# Patient Record
Sex: MALE | Race: WHITE | Employment: OTHER | URBAN - METROPOLITAN AREA
[De-identification: names, ages, dates, MRNs, and addresses within clinical notes are randomized per-mention and may not be internally consistent; named-entity substitution may affect disease eponyms.]

---

## 2017-02-27 ENCOUNTER — TELEPHONE (OUTPATIENT)
Dept: NEUROLOGY | Facility: CLINIC | Age: 62
End: 2017-02-27

## 2017-02-27 NOTE — TELEPHONE ENCOUNTER
Rec'd fax refill request for Memantine 5mg from [facility]. Per [facility] records, this patient was seen by Dr. Claudia Valdovinos in ED, but never in clinic. One fill of this medication was given with the note that further refills must come from PCP.   Per TE from 10/11

## 2017-05-02 ENCOUNTER — APPOINTMENT (OUTPATIENT)
Dept: CT IMAGING | Facility: HOSPITAL | Age: 62
End: 2017-05-02
Attending: EMERGENCY MEDICINE
Payer: MEDICARE

## 2017-05-02 ENCOUNTER — APPOINTMENT (OUTPATIENT)
Dept: GENERAL RADIOLOGY | Facility: HOSPITAL | Age: 62
End: 2017-05-02
Attending: EMERGENCY MEDICINE
Payer: MEDICARE

## 2017-05-02 PROCEDURE — 71010 XR CHEST AP PORTABLE  (CPT=71010): CPT

## 2017-05-02 PROCEDURE — 70450 CT HEAD/BRAIN W/O DYE: CPT

## 2017-05-02 NOTE — ED NOTES
BEDSIDE REPORT WAS RECEIVED FROM [facility]. PER REPORT, PT IS NOT SUICIDAL AND NO SECLUSION IS NEEDED. PT IS CALM WITH FLAT AFFECT. FAMILY AT BEDSIDE.  PT DECLINES ANY SI.

## 2017-05-02 NOTE — ED PROVIDER NOTES
Patient Seen in: [facility] Emergency Department    History   Patient presents with:  Eval-P (psychiatric)  Altered Mental Status (neurologic)    Stated Complaint: confusion for two weeks     HPI    Is a 78-year-old male presenting with evaluation. times daily. Sertraline HCl 100 MG Oral Tab,  Take 1.5 tablets (150 mg total) by mouth daily. This is his usual dose  Patient taking differently: Take 100 mg by mouth daily.  This is his usual dose    Benztropine Mesylate 0.5 MG Oral Tab,  Take 1 tablet mmHg  Pulse 60  Temp(Src) 98.1 °F (36.7 °C) (Temporal)  Resp 16  Ht 172.7 cm (5' 8\")  Wt 59.8 kg  BMI 20.05 kg/m2  SpO2 96%        Physical Exam  Generally the patient is alert and oriented ×3 and appears in no distress patient appears unkempt and very we RBC 3.88 (*)     HGB 12.5 (*)     HCT 36.0 (*)     RDW-SD 47.6 (*)     Neutrophil Absolute Prelim 6.84 (*)     Neutrophil Absolute 6.84 (*)     Monocyte Absolute 1.23 (*)     All other components within normal limits   ETHYL ALCOHOL - Normal   PROTHROMBIN

## 2017-05-02 NOTE — ED NOTES
Pt states not eating or drinking much fluid,  Not taking meds,  Thought it would be all right without them. Pt and family deny suicidal thoughts,   Pt states he has no physical complaints,  Appears weak and unsteady while getting undressed wit assistance.

## 2017-05-02 NOTE — ED INITIAL ASSESSMENT (HPI)
Pt to ED from home with family. Family states has noted pt to be more confused, with difficulty completing sentences, decreased po intake, not taking medications and visual hallucinations. Pt reports SI, denies plan.

## 2017-05-03 PROBLEM — F03.90 MAJOR NEUROCOGNITIVE DISORDER (HCC): Status: ACTIVE | Noted: 2017-05-03

## 2017-05-03 PROBLEM — F31.5 BIPOLAR I DISORDER, MOST RECENT EPISODE (OR CURRENT) DEPRESSED, SEVERE, SPECIFIED AS WITH PSYCHOTIC BEHAVIOR (HCC): Status: ACTIVE | Noted: 2017-05-03

## 2017-05-03 NOTE — ED NOTES
Sylvia from [facility] at bedside, family also at bedside for psych evaluation. Per nurse to nurse report, pt is not eating or drinking and family believes he is not taking his medications. Pt is confused, diagnoses with early dementia 2016.  Pt does not know th

## 2017-05-03 NOTE — BH LEVEL OF CARE ASSESSMENT
Level of Care Assessment Note  General Questions  Why are you here?: Pt reported \"I am sleepy, I dont know why I am here. I am sick\"' Pt presents confused, disorganized speech and he is oriented x2 (self and place). Pt appears frail/weak and disheveled. Pt has flight of ideas and his thoughts are disorganized. Pt has been pacing in the house and is unable sit or calm himself down. Pt  has not been taking his medications.  Pt has not been eating well for the past 3 weeks.  He goes days without eating anyhtin Attempt, Did You Expect to[de-identified] Unknown  Describe Past Expectation: Pt refused to answert he question and kept his eyes closed    Past Suicide Risk Mitigating Factors: Unknow per pt    Past Suicide Risk Collateral Provided By[de-identified] Family    Describe Past Suicide asleep  Number of Sleep Hours: 2 Hours  Use of Sleep Aids: Per pt he does not sleep all nigth    Appetite Symptoms: No appetite  Unplanned Weight Loss: No  Unplanned Weight Gain: No  History of Eating Disorder: No  Active Eating Disorder: No of Gang Involvement: No  Type of Residence: Private residence    Abuse Assessment  Physical Abuse: Denies  Verbal Abuse: Denies  Sexual Abuse: Denies  Neglect: Denies  Does anyone say or do something to you that makes you feel unsafe?: No  [facility] from 6/6/16- 6/9/16 for Altered Mental Status. Pt was admitted to [facility] 11/18/15-11/23/15 for Bipolar dx.  Pt has a current psychiatrist but no current therapist     Level of Care Recommendations  Consulted with: Dr. Stephany Sandy recommended inpt admission

## 2017-05-03 NOTE — ED NOTES
Pt up to the bathroom with clear speech. Spoke with Jazmine Ennis who is waiting for the psychiatrist to call back for admission.

## 2017-05-03 NOTE — ED NOTES
RN went into room for wound care. Pt's head was covered with blanket, when uncovered, RN found pt had spilled his orange juice all over himself. Pt was muttering to himself and hands were shaking. Pt was regowned, new blankets given.  Accucheck performed:

## 2017-05-05 ENCOUNTER — OFFICE VISIT (OUTPATIENT)
Dept: WOUND CARE | Facility: HOSPITAL | Age: 62
End: 2017-05-05
Attending: INTERNAL MEDICINE
Payer: MEDICARE

## 2017-05-05 DIAGNOSIS — S81.801A LEG WOUND, RIGHT, INITIAL ENCOUNTER: Primary | ICD-10-CM

## 2017-05-05 DIAGNOSIS — S81.802A LEG WOUND, LEFT, INITIAL ENCOUNTER: ICD-10-CM

## 2017-05-05 PROCEDURE — 99214 OFFICE O/P EST MOD 30 MIN: CPT

## 2018-01-08 ENCOUNTER — APPOINTMENT (OUTPATIENT)
Dept: CT IMAGING | Facility: HOSPITAL | Age: 63
DRG: 948 | End: 2018-01-08
Attending: EMERGENCY MEDICINE
Payer: MEDICARE

## 2018-01-08 PROBLEM — G31.09 OTHER FRONTOTEMPORAL DEMENTIA WITH BEHAVIORAL DISTURBANCE (HCC): Status: ACTIVE | Noted: 2018-01-08

## 2018-01-08 PROBLEM — F02.81 OTHER FRONTOTEMPORAL DEMENTIA WITH BEHAVIORAL DISTURBANCE (HCC): Status: ACTIVE | Noted: 2018-01-08

## 2018-01-08 PROCEDURE — 70450 CT HEAD/BRAIN W/O DYE: CPT | Performed by: EMERGENCY MEDICINE

## 2018-01-09 ENCOUNTER — APPOINTMENT (OUTPATIENT)
Dept: MRI IMAGING | Facility: HOSPITAL | Age: 63
DRG: 948 | End: 2018-01-09
Attending: PHYSICIAN ASSISTANT
Payer: MEDICARE

## 2018-01-09 PROCEDURE — 70553 MRI BRAIN STEM W/O & W/DYE: CPT | Performed by: PHYSICIAN ASSISTANT

## 2018-01-09 NOTE — PLAN OF CARE
Pt is A/O x 1. Only knows he is in the hospital and wants to leave. States he cannot afford to be here and he wants to go home. Pt told it is not safe for him to leave at this time and that he needs to be evaluated by a physician.     Pt is in restraints be

## 2018-01-09 NOTE — ED INITIAL ASSESSMENT (HPI)
Sister states went to see the patient today and found him to be confused, states the house was in disarray, states the stove and oven were on. Family thinks the patient may have taken too much of his medications.

## 2018-01-09 NOTE — ED NOTES
Received call from lab. Pt's blood sugar 58mg/dl. Pt on his way back from CT. Passed dysphagia screen. Apple juice given and taken well. Pt more alert and verbalizing. Drank juice well.

## 2018-01-09 NOTE — ED NOTES
Pt states \"i have to go outside to smoke\". Pt reminded he is unable to smoke, but will ask for nicotine patch for him. Ok to order nicotine patch .

## 2018-01-09 NOTE — PROGRESS NOTES
[id number] Preliminary Note    Claudell Poser Patient Status:  Inpatient    1955 MRN [id number]   [facility] 3NE-A Attending Tex Owens MD   Hosp Day # 1 PCP Maddi Cantor MD     REASON FOR EVALUATION: AMS    HIS tobacco use includes Chew. He reports that he does not drink alcohol or use drugs. ALLERGIES:    Lactose Intolerant          MEDICATIONS:    No current outpatient prescriptions on file.     Current Facility-Administered Medications:  CefTRIAXone Sodium ( otherwise unremarkable, Na level normal 140  Drug Screen neg  EtOH < 3  UA +UTI, culture pending      IMAGING:  CTH 1/8/18:  CONCLUSION:  No acute intracranial abnormality. MRI Brain June 2016:  CONCLUSION:    1.  No acute infarct, intracranial hemorrhag

## 2018-01-09 NOTE — ED NOTES
Collier noise from pt's room. Pt found sitting on floor in room. Side rails up. Pt has skin tears superficial to L forearm. No other signs of injury.   Skeet Grippe notified

## 2018-01-09 NOTE — ED NOTES
Siblings with pt in room and ask for assistance as pt is getting agitated, trying to get out of bed, playing with IV line and wanting to take off name band. Orders received.   Will move pt closer to nurses station

## 2018-01-09 NOTE — ED PROVIDER NOTES
Patient Seen in: [facility] Emergency Department    History   Patient presents with:  Altered Mental Status (neurologic)    Stated Complaint: AMS possible medication reaction    HPI    77-year-old male presents emergency department who apparently si above.    Physical Exam   ED Triage Vitals [01/08/18 1809]  BP: 134/74  Pulse: 76  Resp: 16  Temp: 97.6 °F (36.4 °C)  Temp src: Temporal  SpO2: 96 %  O2 Device: None (Room air)    Current:/90   Pulse 59   Temp 97.6 °F (36.4 °C) (Temporal)   Resp 16 Abnormality         Status                     ---------                               -----------         ------                     CBC W/ DIFFERENTIAL[[id number]]          Abnormal            Final result                 Please view results for th provider specified.       Medications Prescribed:  Current Discharge Medication List        Present on Admission  Date Reviewed: 5/12/2017          ICD-10-CM Noted POA    Major neurocognitive disorder F03.90 5/3/2017 Unknown

## 2018-01-09 NOTE — ED NOTES
Pt asking to go outside to smoke a cigarette.   Assisted to use urinal.  Mixing up words stating \"I need to go in about noon\" when asked if he had to use urinal.  Asked pt if he uses cane and pt states \"yes\",   Brother in room with pt and notifies staff

## 2018-01-09 NOTE — PROGRESS NOTES
PSYCH CONSULT    Date of Admission: 1/8/18  Date of Consult: 1/9/18  Reason for Consultation: Altered mental status, Bipolar disorder, Dementia    Impression:  AXIS 1: Acute delirium, possibly from benzo withdrawal (ran out of Klonipin early).  Mild dementi

## 2018-01-09 NOTE — ED NOTES
Giving report to floor when pt found on floor. Full report not given.   Will need to call back to complete report

## 2018-01-09 NOTE — PROGRESS NOTES
NURSING ADMISSION NOTE      Patient admitted via Cart  Oriented to room. Safety precautions initiated. Bed in low position. Call light in reach. Pt is very poor historian, is not very aware of what is going on.  Does not realize he is in the hospi

## 2018-01-09 NOTE — ED NOTES
Pt attempting to get out of bed with side rails up again. Dr. Jonathan Velez notified. Order for soft restraint received. Pt used urinal while up and then put urinal to lips to drink.   Staff with pt and urinal taken

## 2018-01-09 NOTE — ED NOTES
Sister, DARLENE [facility] and Brother Faith Stanford leaving at this time. Pt is sleeping at this time. Merry or Faith Stanford can be reached anytime and were siblings here with pt throughout his ER visit.   They would like to be contacted with any significant changes, otherwise they will

## 2018-01-09 NOTE — ED NOTES
Pt moved next to nurses station. Pt asking to \"get out of here\". Pt ambulated to bathroom and voided in toilet. Trying to wash hands with pipes on back of toilet.   Assisted to sink and puts soap on hands with towl and then does not rinse soap off hand

## 2018-01-09 NOTE — CONSULTS
[facility]  Report of Consultation    Silviano Azul Patient Status:  Inpatient    1955 MRN [id number]   [facility] 3NE-A Attending Jd Urrutia MD   Hosp Day # 1 PCP Bryson Gosselin, MD     Reason for Consultation:  Altered mental incontinence.        History:  Past Medical History:   Diagnosis Date   • Anxiety    • Bipolar affective ([facility] [address]    • COPD (chronic obstructive pulmonary disease) ([facility] [address]    • Depression      Past Surgical History:  6.24.10: OTHER SURGICAL HISTORY      Comment: (VISINE) 0.05 % Ophthalmic Solution Place 2 drops into both eyes 2 (two) times daily as needed (Eye Redness). Disp:  Rfl:  Unknown   Budesonide-Formoterol Fumarate (SYMBICORT IN) Inhale 2 puffs into the lungs every morning.    Disp:  Rfl:  Taking   Albutero Benztropine Mesylate (COGENTIN) tab 1 mg, 1 mg, Oral, BID PRN  •  nicotine (NICODERM CQ) 21 MG/24HR 1 patch, 1 patch, Transdermal, Daily    Review of Systems:  A 10-point system was reviewed. Pertinent positives and negatives are noted in HPI.       Physic 01/08/2018   ALT 18 01/08/2018   ETOH <3 01/08/2018   PGLU 99 01/08/2018     U/A: +LE, large leukocytes, +WBC; culture pending    Imaging:  Ct Brain Or Head ([id number])    Result Date: 1/8/2018  CONCLUSION:  No acute intracranial abnormality.     Dictated by: Jose Lopez

## 2018-01-09 NOTE — CONSULTS
[facility]  Report of Psychiatric Consultation    Eyadjessenia CastilloMedina Patient Status:  Inpatient    1955 MRN [id number]   [facility] 3NE-A Attending Sana Nelson MD   Hosp Day # 1 PCP Luan Carr MD     Date of Admission: 18  Da bottle of 0.5mg tabs- take 1 tab TID prn anxiety, was filled on 12/15/17 and empty. He was brought to the ED for evaluation and had a head CT that showed no acute bleed.  He had pyuria on UA so was empirically started on Ceftriaxone, while the UCx is pendin Mother 72   • Hypertension Father      Allergies:    Lactose Intolerant          Medications:    Current Facility-Administered Medications:   •  CefTRIAXone Sodium (ROCEPHIN) 1 g in sodium chloride 0.9 % 100 mL IVPB-minibag/addvantage, 1 g, Intravenous, Q2 01/08/2018   CO2 26.0 01/08/2018   GLU 58 01/08/2018   CA 8.8 01/08/2018   ALB 3.2 01/08/2018   ALKPHO 114 01/08/2018   BILT 0.4 01/08/2018   TP 6.0 01/08/2018   AST 25 01/08/2018   ALT 18 01/08/2018   ETOH <3 01/08/2018   PGLU 99 01/08/2018       STUDIES: functional memory, but clearly there is deterioration.   In fact, working memory is adequate in the low-average range on the Adaptive Digit Ordering Test.  Unfortunately, once this threshold of working memory is overloaded, there is a failure for proper con Confrontational naming and word-finding is in the average range with a score of 8 (mean of 10).   Verbal fluency and word production, another measure of executive process associated with cognitive flexibility, is in the borderline range at the 15th percent Supervision for general safety such as medication management, nutritional intake, activity stimulation, and traveling in the community is suggested.   Support may be provided in a written format with a planner or checklist, but assistance in implementing st

## 2018-01-09 NOTE — PLAN OF CARE
Impaired Activities of Daily Living    • Achieve highest/safest level of independence in self care Progressing        Impaired Cognition    • Patient will exhibit improved attention, thought processing and/or memory Progressing        Impaired Functional M

## 2018-01-10 NOTE — PROGRESS NOTES
[id number] Progress Note    Yamil Medina Patient Status:  Inpatient    1955 MRN [id number]   [facility] 3NE-A Attending Sana Nelson MD   Hosp Day # 2 PCP Luan Carr MD         Subjective:  Yamil Medina is a(n) 6 disorder  Frontotemporal dementia with behavioral disturbance  Tobacco use    Plan:  Psychiatry following, managing meds and in discussion with family re: pt safety at home  · d/c'd benzos; pt has hx abusing them  Continue abx for UTI   DVT prophylaxis, Lo

## 2018-01-10 NOTE — PLAN OF CARE
Pt is A/O x 4. MRI of brain Tue night  Still a little impulsive about going to the bathroom but does use call light to call staff. Urinary frequency not as often, urgency still there. Bed alarm still needed.    IV Rocephin Q 24 and IVF  Psych managed psy

## 2018-01-10 NOTE — H&P
[facility]    PATIENT'S NAME: Rene KIMBERLY   ATTENDING PHYSICIAN: Barry Watkins M.D.    PATIENT ACCOUNT#:   [de-identified]    LOCATION:  [address] [place] [address]  MEDICAL RECORD #:   [id number]       YOB: 1955  ADMISSION DATE:       01/08/2018    H EKG:  Normal sinus rhythm, sinus bradycardia. CT brain was negative. ASSESSMENT:    1. Acute delirium:  Could be benzodiazepine withdrawal.  Alcohol is still a possibility.    2.   History of dementia, frontotemporal.  3.   History of chroni

## 2018-01-10 NOTE — PROGRESS NOTES
[facility]  Report of Psychiatric Progress Note     Date of Admission: 1/8/18  Date of Service: 1/10/18  Reason for Consultation: Altered mental status, Bipolar disorder, Dementia     Impression:  AXIS 1: Acute delirium, possibly from benzo withdrawal taken apart. He was packing boxes and disorganized and not making any sense. They noticed that his Klonipin bottle of 0.5mg tabs- take 1 tab TID prn anxiety, was filled on 12/15/17 and empty.  He was brought to the ED for evaluation and had a head CT that s SCC-superficial, well diff  5/7/15: SKIN SURGERY      Comment: Excision to Left Dorsal Forearm (SCC)  1994: SPECIAL SERVICE OR REPORT      Comment: ORTHOSCOPIC RIGHT KNEE SURG   1979: SPECIAL SERVICE OR REPORT      Comment: JAW SURG         Family History T2-weighted images with FLAIR sequences and diffusion weighted images without and with infusion. PATIENT STATED HISTORY:(As transcribed by Technologist)  AMS, Dementia, patient having left ower extremity stiffness.       CONTRAST USED:  11  cc of parama his current presentation, certainly this was evidence of a delirium.  He has been functioning at home with some family support. [facility] again was admitted to the hospital with confusion and cognitive disorganization.     TESTS ADMINISTERED:  Neurobehavioral Co threshold for normal limits.  This even indicates some difficulty with recognition discrimination.  Certainly, there is variability at this time.  This has potential to have some delirium or continued positive prognosis and improvement.   [place], carlos me is able to appropriately set the time.   The patient certainly endorses depressive symptomatology, helplessness and hopelessness. [facility] recognizes he has some memory problems. [facility] feels worthless and understimulated.  Unfortunately, this leads to feeling muc

## 2018-01-10 NOTE — DISCHARGE SUMMARY
[facility]  Discharge Summary    Lina Wright Patient Status:  Inpatient    1955 MRN [id number]   [facility] 3NE-A Attending Nayeli Mancuso MD   Hosp Day # 2 PCP Dariana Hernandez MD     Date of Admission: 2018    Date of Dischar chart for details    Disposition: [address] [facility]    Discharge Condition: Stable    Discharge Medications: Current Discharge Medication List    CONTINUE these medications which have CHANGED    Sertraline HCl 50 MG Oral Tab  Doesn't need script.  J

## 2018-01-11 NOTE — PROGRESS NOTES
NURSING DISCHARGE NOTE    Discharged Home via Wheelchair. Accompanied by Support staff  Belongings Taken by patient/family. DC education/ instructions provided to pt. All questions answered.  Pt verbalized understanding

## 2018-01-18 NOTE — DISCHARGE SUMMARY
[facility]  Discharge Summary    Baljit Quintero Patient Status:  Inpatient    1955 MRN [id number]   [facility] 3NE-A Attending No att. providers found   Hosp Day # 3 PCP Samson Argueta MD     Date of Admission: 2018    Date of Dis Please refer to chart for details     Disposition: [address]     Discharge Condition: Stable     Discharge Medications: Current Discharge Medication List     CONTINUE these medications which have CHANGED     Sertraline HCl 50 MG Oral Tab  D days       Consultations:   please refer to chart for details  Procedures:  Please refer to chart for details    Disposition: Home or Self Care    Discharge Condition: Stable    Discharge Medications: Discharge Medication List as of 1/11/2018  9:08 AM    C Historical      STOP taking these medications    ClonazePAM 0.5 MG Oral Tab    Memantine HCl 5 MG Oral Tab          Follow up Visits: Follow-up with all MD's as per their recommendation/ refer to chart for details. 3 days    Rosaura Winlker  1/18/2018  5:18 P

## 2018-05-23 ENCOUNTER — HOSPITAL ENCOUNTER (OUTPATIENT)
Dept: GENERAL RADIOLOGY | Facility: HOSPITAL | Age: 63
Discharge: HOME | End: 2018-05-23
Attending: INTERNAL MEDICINE
Payer: MEDICARE

## 2018-05-23 DIAGNOSIS — J44.9 CHRONIC OBSTRUCTIVE PULMONARY DISEASE, UNSPECIFIED COPD TYPE (HCC): ICD-10-CM

## 2018-05-23 PROCEDURE — 71046 X-RAY EXAM CHEST 2 VIEWS: CPT | Performed by: INTERNAL MEDICINE

## 2018-08-17 ENCOUNTER — HOSPITAL (OUTPATIENT)
Dept: OTHER | Age: 63
End: 2018-08-17
Attending: UROLOGY

## 2018-08-17 LAB — PSA SERPL-MCNC: 0.8 NG/ML

## 2018-09-14 ENCOUNTER — HOSPITAL ENCOUNTER (OUTPATIENT)
Dept: GENERAL RADIOLOGY | Facility: HOSPITAL | Age: 63
Discharge: HOME | End: 2018-09-14
Attending: INTERNAL MEDICINE
Payer: MEDICARE

## 2018-09-14 DIAGNOSIS — J44.9 COPD (CHRONIC OBSTRUCTIVE PULMONARY DISEASE) (HCC): ICD-10-CM

## 2018-09-14 PROCEDURE — 71046 X-RAY EXAM CHEST 2 VIEWS: CPT | Performed by: INTERNAL MEDICINE
